# Patient Record
Sex: FEMALE | Race: WHITE | NOT HISPANIC OR LATINO | Employment: FULL TIME | URBAN - METROPOLITAN AREA
[De-identification: names, ages, dates, MRNs, and addresses within clinical notes are randomized per-mention and may not be internally consistent; named-entity substitution may affect disease eponyms.]

---

## 2021-03-22 ENCOUNTER — INITIAL PRENATAL (OUTPATIENT)
Dept: OBSTETRICS AND GYNECOLOGY | Facility: CLINIC | Age: 26
End: 2021-03-22

## 2021-03-22 VITALS
SYSTOLIC BLOOD PRESSURE: 110 MMHG | WEIGHT: 118 LBS | HEIGHT: 62 IN | DIASTOLIC BLOOD PRESSURE: 73 MMHG | BODY MASS INDEX: 21.71 KG/M2

## 2021-03-22 DIAGNOSIS — Z34.00 SUPERVISION OF NORMAL FIRST PREGNANCY, ANTEPARTUM: Primary | ICD-10-CM

## 2021-03-22 LAB
GLUCOSE UR STRIP-MCNC: NEGATIVE MG/DL
PROT UR STRIP-MCNC: ABNORMAL MG/DL

## 2021-03-22 PROCEDURE — 99204 OFFICE O/P NEW MOD 45 MIN: CPT | Performed by: OBSTETRICS & GYNECOLOGY

## 2021-03-22 PROCEDURE — 81025 URINE PREGNANCY TEST: CPT | Performed by: OBSTETRICS & GYNECOLOGY

## 2021-03-22 NOTE — PATIENT INSTRUCTIONS
Travel During Pregnancy:  • Always use seatbelts.  A lap belt should be worn below the abdomen (across the hips) and the shoulder belt should be worn across the center of your chest (between the breasts) away from your neck.  Do not put the shoulder belt under your arm or behind your back.  Pull any slack out of the belt.  • Air travel is safe in most uncomplicated pregnancies, but we do not recommend air travel past 36 weeks.  Airlines may also have restrictions, so check with your airline before flying.  For some international flights, the travel cut off may be as early as 28 weeks gestation, and some airlines may require letters from your physician.  • When going on long trips in car, plane, train, or bus, frequent ambulation is important to prevent blood clots in legs and/or lungs.  The following may help with prevention of blood clots in legs: Drink lots of fluid, wear loose-fitting clothing, walk and stretch at regular intervals.    • Avoid areas with Zika outbreaks.  For the latest information, you may visit: www.cdc.gov/travel/notices/.  Resources: , , Committee Opinion 455  Nutrition during pregnancy  • Average weight gain during pregnancy is based on your pre-pregnancy body mass index (BMI).  See below for recommended weight gain:  o Underweight (BMI <18.5), we recommend 28 to 40lb weight gain  o Normal weight (BMI 18.5 to 24.9), we recommend a 25 to 35lb weight gain  o Overweight (BMI 25-29.9), we recommend a 15 to 25lb weight gain  o Obese (BMI >30), we recommend keeping weight gain under 20 lbs.    • You should speak with your physician regarding your specific weight goals for this pregnancy.   • Foods to avoid include:   o Fish: avoid certain types of fish such as shark, swordfish, lina mackerel, tilefish.  Limit white (albacore) tuna to 6 oz per week.  Choose fish and shellfish such as shrimp, salmon, catfish, [place].  o Food-borne illness: Pregnant women are much more likely to get  Listeriosis than non-pregnant women.  To help prevent this, avoid eating unpasteurized milk and unpasteurized milk products, hot dogs/lunch meat/cold cuts unless they are heated until steaming right before serving, refrigerated meat spreads, refrigerated smoked seafood, raw or undercooked seafood/eggs/meat.   • Vitamin D helps development of the baby’s bones and teeth.  Good sources of Vitamin D include milk fortified with Vitamin D and fatty fish such as salmon.    • Folic acid, also known as folate, helps develop the baby’s brain and spine.  You should make sure your vitamin contains extra folic acid - at least 400mcg.    • Iron helps make red blood cells.  You need to make extra red blood cell sin pregnancy.  We recommend eating Iron-rich foods such as lean red meat, poultry, fish, dried beans and peas, iron-fortified cereals, and prune juice.  You may be recommended an iron supplement.  If so, it is absorbed more easily if taken with vitamin C-rich foods such as citrus fruits or tomatoes.    • It is important to eat a well balanced diet.  A good recourse for nutrition recommendations is: www.choosemyplate.gov.  • Limit caffeine intake to 200mg daily.  Some coffees/teas/sodas have very different levels of caffeine per serving, so check the nutrition labeling.   Resources: , Committee Opinion 548  Exercise during pregnancy  • If you are healthy and your pregnancy is normal, it is safe to continue or start most types of exercise.   Physical activity does not increase your risk of miscarriage, low birth weight or early delivery, but you should discuss specific limitations or any complications with your physician.    • Benefits of exercise during pregnancy include: reduced back pain, less constipation, promotes overall health and healthy weight gain which may decrease risks for certain pregnancy complications such as diabetes and/or preeclampsia.  • The CDC recommends 150 minutes of moderate intensity aerobic  exercise per week.  Moderate intensity means that you are moving enough to raise your heart rate and start sweating, but you can talk normally.  Brisk walking, swimming/water workouts, modified yoga/pilates, and use of elliptical machines and/or stationary bikes are examples of aerobic activity.    • Precautions:  o Stay hydrated.  Drink plenty of water before, during and after your workout  o Wear supportive clothing such as a sports bra  o Do not become overheated.  Do not exercise outside when it is very hot or humid  o Avoid lying flat on your back as much as possible  o AVOID contact sports, skydiving, sports that risk falling (such as skiing, surfing, off-road cycling, gymnastics, horseback riding), hot yoga/hot pilates, scuba diving  • Stop exercising if you experience: bleeding from the vagina, feeling dizzy/faint, shortness of breath before starting exercise, chest pain, headache, muscle weakness, calf pain or swelling, regular uterine contractions, fluid leaking from the vagina.    • Postpartum exercise: Continuing exercise after you deliver your baby will help boost your energy, strengthen muscles, promote better sleep, and relieve stress.  It also may be useful in preventing postpartum depression.  150 minutes of moderate-intensity aerobic activity is recommended.  Types of exercise and when you can start a regular exercise routine may be limited by the type of delivery you had.  Please discuss with your physician prior to resuming or starting exercise.    Resources: ,   Back pain during pregnancy  • Back pain is very common during pregnancy.  It may arise due to strain on your back muscles, weakness of the abdominal muscles, and pregnancy hormones.    • To prevent back pain:  o Wear shoes with good support. Flat shoes and high heels may not have good arch support.  o Consider a firm mattress  o Use good lifting practices.  Do not bend from the waist to pick things up.  Squat down and bend  "your knees, keeping a straight back.  o Sit in chairs with good back support or use a small pillow behind your lower back.    o Sleep on your side with one or two pillows between your legs  • To ease back pain:   o Exercise can help stretch strained muscles and strengthen weak muscles to promote good posture  • Contact your health care provider with severe pain, pain that persists for more than 2 weeks, fever, burning during urination, or vaginal bleeding.  Resources:     Nausea/vomiting in pregnancy:  To help with nausea and vomiting you may try the following over the counter products:  Dominique chews, dominique tea, dominique gum  Mint tea, peppermint gum or candy  B6/Doxylamine: to be taken daily, not just when symptoms occur  Pyridoxine (also known as B6): 10 to 25 mg orally every six to eight hours; the maximum treatment dose suggested for pregnant women is 200 mg/day.  Doxylamine (available in some over-the-counter sleeping pills (eg, Unisom Sleep Tabs) and as an antihistamine chewable tablet (Aldex AN)). One-half of the 25 mg over-the-counter tablet or two chewable 5 mg tablets can be used off-label as an antiemetic  · The Association of [employer]st[employer] has released a smart phone application that can help you monitor and manage your symptoms.  The Application is \"Managing NVP,\" and has a green icon that says \"APGO WELLMOM.\"    If these do not work, we may need to try prescription medications.    Please contact us if you are unable to tolerate liquids (even sips of water) for over six to eight hours, have weight loss of over 10% of your body weight, blood in vomit, fever (temp of 100.4 or higher), or other concerning symptoms arise.    The following are CPT codes for the optional tests in pregnancy:  Cystic fibrosis screenin  Spinal Muscular atrophy screening 78483  Cell free DNA (Trisomy 21, 18, 13 and sex chromosomes) 26414 - [id number]    The ICD 10 code for most pregnancies " is Z34.90

## 2021-03-22 NOTE — PROGRESS NOTES
Initial OB Visit    Chief Complaint   Patient presents with   • Initial Prenatal Visit     nausea and vomiting. Pt also reports breast tenderness. [facility].  She is a 25 y.o.    8w3d gestation by unsure LMP. Patient's last menstrual period was 2021 (exact date). Had Nexplanon removed in 2020 for goal of pregnancy in .    She had started a PNV.   FOB: Luis Miguel Lewis, aware and in support. They are engaged.    This is not a planned pregnancy.   OB History    Para Term  AB Living   1             SAB TAB Ectopic Molar Multiple Live Births                    # Outcome Date GA Lbr Mario/2nd Weight Sex Delivery Anes PTL Lv   1 Current              Current obstetric complaints:     Nausea, vomiting   Anxiety about ritalin and THC exposure in early pregnancy  Prior obstetric issues, potential pregnancy concerns: see above.  She is unsure of her LMP because they were irregular after coming off Nexplanon. Is on PNV  Family history of genetic issues (includes FOB): denies  Prior infections concerning in pregnancy (Rash, fever since LMP): denies; was on Ritalin and stopped when she took her first pregnancy test that was positive. H/o THC use.    Varicella Hx: immune by history and vaccination (reported)  Prior genetic testing: denies  History of abnormal pap smears: denies - last was 12/3/2020 and was NIL  History of STIs: h/o chlamydia in ;   History of HSV in self or partner? Denies (she was tested for HSV IgG in 2016 and was normal).    Prepregnancy weight 114lb    Past Medical History:   Diagnosis Date   • Chlamydia        History reviewed. No pertinent surgical history.    No current outpatient medications on file.    No Known Allergies    Social History     Socioeconomic History   • Marital status: Single     Spouse name: Not on file   • Number of children: Not on file   • Years  "of education: Not on file   • Highest education level: Not on file   Tobacco Use   • Smoking status: Never Smoker   • Smokeless tobacco: Never Used   Substance and Sexual Activity   • Alcohol use: Not Currently   • Drug use: Yes     Types: Marijuana     Comment: prior   • Sexual activity: Yes     Partners: Male     Birth control/protection: None       Family History   Problem Relation Age of Onset   • Breast cancer Neg Hx    • Ovarian cancer Neg Hx    • Uterine cancer Neg Hx    • Colon cancer Neg Hx    • Deep vein thrombosis Neg Hx    • Pulmonary embolism Neg Hx        Review of systems     Constitutional: negative for chills, fevers and negative for fatigue  Eyes: negative  Ears, nose, mouth, throat, and face: negative for hearing loss and nasal congestion  Respiratory: negative for asthma and wheezing  Cardiovascular: negative for chest pain and dyspnea  Gastrointestinal: negative for dyspepsia, dysphagia abdominal pain  Genitourinary:negative for urinary incontinence  Integument/breast: negative for breast lump  Hematologic/lymphatic: negative for bleeding  Musculoskeletal:negative for aches  Neurological: negative for numbness/tingling  Behavioral/Psych: negative for anhedonia  Allergic/Immunologic: negative for rash, allergy         Objective    /73   Ht 157.5 cm (62\")   Wt 53.5 kg (118 lb)   LMP 01/22/2021 (Exact Date)   BMI 21.58 kg/m²       General Appearance:    Alert, cooperative, in no acute distress, habitus normal   Head:    Normocephalic, without obvious abnormality, atraumatic   Eyes:            Lids and lashes normal, conjunctivae and sclerae normal, no   icterus, no pallor, corneas clear   Ears:    Ears appear intact with no abnormalities noted       Neck:   No adenopathy, supple, trachea midline, no thyromegaly   Back:     No kyphosis present, no scoliosis present,                       Lungs:     Clear to auscultation,respirations regular, even and                   unlabored    Heart:   "  Regular rhythm and normal rate, normal S1 and S2, no            murmur, no gallop, no rub, no click   Breast Exam:    No masses, No nipple discharge   Abdomen:     Normal bowel sounds, no masses, no organomegaly, soft        non-tender, non-distended, no guarding, no rebound                 tenderness   Genitalia:    Vulva - BUS-WNL, NEFG    Vagina - No discharge, No bleeding    Cervix - No Lesions, closed     Uterus - Consistent with 8 weeks    Adnexa - No masses, NT    Pelvimetry - clinically adequate, gynecoid pelvis     Extremities:   Moves all extremities well, no edema, no cyanosis, no              redness   Pulses:   Pulses palpable and equal bilaterally   Skin:   No bleeding, bruising or rash   Lymph nodes:   No palpable adenopathy   Neurologic:   Sensation intact, A&O times 3      Assessment  Pregnancy at 8w3d  Nausea and vomiting  Breast tenderness     Plan    Initial labs drawn, GC/CHL screen done  Patient is on Prenatal vitamins  Problem list reviewed and updated.  Reviewed routine prenatal care with the office to include but not limited to:   Zika (travel restrictions/ok to use insect repellant), not to changing cat litter, food restrictions, avoidance of alcohol, tobacco and drugs and saunas/hot tubs, anticipated weight gain/nutrition requirements.  Reviewed nature of practice and hospital.  Reviewed recommended follow up, importance of compliance with care. We reviewed testing in pregnancy including HIV testing and urine drug screen.    Reviewed aneuploidy screening and CF/SMA screening.  We reviewed limitations of testing, possibility of false positive/negative results, possible need for other tests as indicated.    Counseled on limitations of ultrasound in pregnancy in detecting aneuploidy/fetal anomalies    Nausea/vomiting - reviewed recommendation for B6/doxylamine. Indication for follow up if not tolerating liquids for 6 or more hours.  Recommend follow up if symptoms not improving.  Breast  tenderness reviewed; bilateral and symmetric.      Reviewed Body mass index is 21.58 kg/m²..  In pregnancy, her recommended weight gain is 35lbs.  In the first trimester, caloric demand is typically not increased.  In the second and third trimester, the increased demand is approximately 350 and 450 calories respectively.    Unsure LMP- will get hcg and US    All questions answered.   Return in about 1 week (around 3/29/2021) for US for dating, 4 weeks for OB visit .      Deanna Yanez MD

## 2021-03-23 ENCOUNTER — PATIENT MESSAGE (OUTPATIENT)
Dept: OBSTETRICS AND GYNECOLOGY | Facility: CLINIC | Age: 26
End: 2021-03-23

## 2021-03-23 LAB
ABO GROUP BLD: NORMAL
B-HCG UR QL: POSITIVE
BASOPHILS # BLD AUTO: 0.1 X10E3/UL (ref 0–0.2)
BASOPHILS NFR BLD AUTO: 1 %
BLD GP AB SCN SERPL QL: NEGATIVE
EOSINOPHIL # BLD AUTO: 0.1 X10E3/UL (ref 0–0.4)
EOSINOPHIL NFR BLD AUTO: 1 %
ERYTHROCYTE [DISTWIDTH] IN BLOOD BY AUTOMATED COUNT: 12.3 % (ref 11.7–15.4)
HBV SURFACE AG SERPL QL IA: NEGATIVE
HCG INTACT+B SERPL-ACNC: NORMAL MIU/ML
HCT VFR BLD AUTO: 38 % (ref 34–46.6)
HCV AB S/CO SERPL IA: <0.1 S/CO RATIO (ref 0–0.9)
HGB BLD-MCNC: 13.2 G/DL (ref 11.1–15.9)
HIV 1+2 AB+HIV1 P24 AG SERPL QL IA: NON REACTIVE
IMM GRANULOCYTES # BLD AUTO: 0 X10E3/UL (ref 0–0.1)
IMM GRANULOCYTES NFR BLD AUTO: 0 %
INTERNAL NEGATIVE CONTROL: NEGATIVE
INTERNAL POSITIVE CONTROL: POSITIVE
LYMPHOCYTES # BLD AUTO: 2.1 X10E3/UL (ref 0.7–3.1)
LYMPHOCYTES NFR BLD AUTO: 25 %
Lab: ABNORMAL
MCH RBC QN AUTO: 31.6 PG (ref 26.6–33)
MCHC RBC AUTO-ENTMCNC: 34.7 G/DL (ref 31.5–35.7)
MCV RBC AUTO: 91 FL (ref 79–97)
MONOCYTES # BLD AUTO: 0.6 X10E3/UL (ref 0.1–0.9)
MONOCYTES NFR BLD AUTO: 7 %
NEUTROPHILS # BLD AUTO: 5.6 X10E3/UL (ref 1.4–7)
NEUTROPHILS NFR BLD AUTO: 66 %
PLATELET # BLD AUTO: 202 X10E3/UL (ref 150–450)
RBC # BLD AUTO: 4.18 X10E6/UL (ref 3.77–5.28)
RH BLD: POSITIVE
RPR SER QL: NON REACTIVE
RUBV IGG SERPL IA-ACNC: 1.63 INDEX
WBC # BLD AUTO: 8.5 X10E3/UL (ref 3.4–10.8)

## 2021-03-23 RX ORDER — DOXYLAMINE SUCCINATE AND PYRIDOXINE HYDROCHLORIDE, DELAYED RELEASE TABLETS 10 MG/10 MG 10; 10 MG/1; MG/1
TABLET, DELAYED RELEASE ORAL
Qty: 100 TABLET | Refills: 3 | Status: SHIPPED | OUTPATIENT
Start: 2021-03-23

## 2021-03-25 LAB
BACTERIA UR CULT: NORMAL
BACTERIA UR CULT: NORMAL
C TRACH RRNA SPEC QL NAA+PROBE: NEGATIVE
N GONORRHOEA RRNA SPEC QL NAA+PROBE: NEGATIVE
T VAGINALIS DNA SPEC QL NAA+PROBE: NEGATIVE

## 2021-03-26 LAB
AMPHETAMINES UR QL SCN: NEGATIVE NG/ML
BARBITURATES UR QL SCN: NEGATIVE NG/ML
BENZODIAZ UR QL: NEGATIVE NG/ML
BZE UR QL: NEGATIVE NG/ML
CANNABINOIDS UR CFM-MCNC: POSITIVE NG/ML
METHADONE UR QL SCN: NEGATIVE NG/ML
OPIATES UR QL: NEGATIVE NG/ML
PCP UR QL: NEGATIVE NG/ML
PROPOXYPH UR QL SCN: NEGATIVE NG/ML

## 2021-04-26 ENCOUNTER — ROUTINE PRENATAL (OUTPATIENT)
Dept: OBSTETRICS AND GYNECOLOGY | Facility: CLINIC | Age: 26
End: 2021-04-26

## 2021-04-26 VITALS — SYSTOLIC BLOOD PRESSURE: 146 MMHG | WEIGHT: 126 LBS | BODY MASS INDEX: 23.05 KG/M2 | DIASTOLIC BLOOD PRESSURE: 77 MMHG

## 2021-04-26 DIAGNOSIS — Z34.00 SUPERVISION OF NORMAL FIRST PREGNANCY, ANTEPARTUM: Primary | ICD-10-CM

## 2021-04-26 PROCEDURE — 99213 OFFICE O/P EST LOW 20 MIN: CPT | Performed by: OBSTETRICS & GYNECOLOGY

## 2021-04-26 RX ORDER — PRENATAL VIT/IRON FUM/FOLIC AC 27MG-0.8MG
TABLET ORAL DAILY
COMMUNITY

## 2021-04-26 NOTE — PATIENT INSTRUCTIONS
If you are having a headache that does not go away with typical medications, chest pain, shortness of breath, or other concerning symptom, please go for evaluation right away.

## 2021-04-26 NOTE — PROGRESS NOTES
"Chief Complaint   Patient presents with   • Routine Prenatal Visit     (in office ) (2nd )      Meme Hill is a 25 y.o.  at 12w0d   Patient is upset regarding the visitor restriction in place at this time for office visits.  \"It's not fair that I have to go through this alone,\" she reports.    She states her  took off work to come here.  She has had a lot of external stressors the last few weeks with getting  and buying a house.    /77   Wt 57.2 kg (126 lb)   LMP 2021 (Exact Date)   BMI 23.05 kg/m²    Gen: tearful  Respiratory: no distress  Abd: nontender  See OB Flowsheet    ASSESSMENT:   1. IUP at 12w0d   2. Elevated blood pressure  PLAN:  Patient with severe elevation on initial BP check. She then left prior to being seen, but the  asked her to come back so we could discuss and recheck.    I expressed sympathy with her stress and frustration. We reviewed with her that the policy was at an administrative level; the hospital has recently opened to two visitors, however I am unsure when our office visitation policy may change.  She is planning to transfer to an office where visitors are allowed.    On repeat the BP had improved; we reviewed signs that would require immediate evaluation (chest pain, SOB, headache).  If BP persists, will likely need labs.    Reports she has no further questions about OB labs; offered cell free DNA; she states, \"I was planning to do that but I can't [while I'm] alone.\"    Recommend OB follow up visit to be scheduled in 4 weeks at the latest. She is planning to call to schedule today.      Problem List Items Addressed This Visit     None      Visit Diagnoses     Supervision of normal first pregnancy, antepartum    -  Primary          No orders of the defined types were placed in this encounter.                 "

## 2021-06-10 ENCOUNTER — TELEPHONE (OUTPATIENT)
Dept: OBSTETRICS AND GYNECOLOGY | Facility: CLINIC | Age: 26
End: 2021-06-10

## 2021-06-10 NOTE — TELEPHONE ENCOUNTER
Hi Cindy Clemente from [facility] Billing called.  Patient is claiming that bill from 4/26 is fraudulent charges.  She states that she was unhappy with our covid policy and having to come in alone and she left without being seen.  Billing they are going to put a stop bill on it for now until they hear from our office.    Billing- [phone]    ThanksDomenica

## 2021-06-15 NOTE — TELEPHONE ENCOUNTER
Called and spoke with Komal. Reviewed that patient came in, had elevation in BP.  I was discussing an urgent matter with another provider and saw her walking down the [address].  My medical assistant informed me that she had an elevated blood pressure and was upset.  I called the  to have her come back and did see the patient, retake her blood pressure and  her, so this is a billable visit.